# Patient Record
Sex: MALE | Race: WHITE | NOT HISPANIC OR LATINO | Employment: FULL TIME | URBAN - METROPOLITAN AREA
[De-identification: names, ages, dates, MRNs, and addresses within clinical notes are randomized per-mention and may not be internally consistent; named-entity substitution may affect disease eponyms.]

---

## 2023-10-06 PROBLEM — M17.32 POST-TRAUMATIC OSTEOARTHRITIS OF LEFT KNEE: Status: ACTIVE | Noted: 2023-10-06

## 2023-10-06 PROBLEM — M62.559 ATROPHY OF QUADRICEPS FEMORIS MUSCLE: Status: ACTIVE | Noted: 2023-10-06

## 2023-10-06 PROBLEM — M25.569 JOINT PAIN, KNEE: Status: ACTIVE | Noted: 2023-10-06

## 2023-10-06 PROBLEM — E55.9 VITAMIN D DEFICIENCY: Status: ACTIVE | Noted: 2023-10-06

## 2023-10-09 ENCOUNTER — TREATMENT (OUTPATIENT)
Dept: PHYSICAL THERAPY | Facility: CLINIC | Age: 58
End: 2023-10-09
Payer: COMMERCIAL

## 2023-10-09 DIAGNOSIS — M25.562 ARTHRALGIA OF LEFT KNEE: ICD-10-CM

## 2023-10-09 DIAGNOSIS — M25.569 JOINT PAIN, KNEE: ICD-10-CM

## 2023-10-09 DIAGNOSIS — M62.559 ATROPHY OF QUADRICEPS FEMORIS MUSCLE: Primary | ICD-10-CM

## 2023-10-09 PROCEDURE — 97110 THERAPEUTIC EXERCISES: CPT | Mod: GP

## 2023-10-09 ASSESSMENT — PAIN - FUNCTIONAL ASSESSMENT: PAIN_FUNCTIONAL_ASSESSMENT: 0-10

## 2023-10-09 ASSESSMENT — PAIN SCALES - GENERAL: PAINLEVEL_OUTOF10: 1

## 2023-10-09 ASSESSMENT — ENCOUNTER SYMPTOMS
DEPRESSION: 0
OCCASIONAL FEELINGS OF UNSTEADINESS: 0
LOSS OF SENSATION IN FEET: 0

## 2023-10-09 NOTE — PROGRESS NOTES
"  Physical Therapy  Physical Therapy Treatment Note    Patient Name: Ken Hylton  MRN: [id number]  Today's Date: 10/9/2023  Time Calculation  Start Time: 0738  Stop Time: 0825  Time Calculation (min): 47 min    Insurance:  Visit number: 6 of 20  Authorization info: Auth needed after 12 , 20 visit total   Insurance Type: [employer]   Current Problem  1. Atrophy of quadriceps femoris muscle        2. Joint pain, knee        3. Arthralgia of left knee            General  Reason for Referral: L knee pain and weakness  Referred By: Lobo Renee PA-C  Precautions   None     Subjective:   Subjective   Patient reports that he is feeling stronger on a day to day basis and is noticing more quad definition. Continues to be compliant with HEP.     Pain  Pain Assessment: 0-10  Pain Score: 1    Objective:   Ortho   Observation   Palpation: Pt had no increase in discomfort when patellar joint play was performed. No tenderness to the touch if L knee. Pt had noticable atrophy of L quadriceps compared to R.   ROM / Joint Mobility   (Range of Motion in degrees)   Knee:   (Key = \" P! \" Denotes Pain with Movement)   Extension: R Active 3, L Active 5.   Flexion: R Active 118, L Active 105.   Strength   Hip:   (Key: \"P!\" Denotes Pain with Movement)   Extension: 4+/5 on right and 4+/5 on left.   Flexion: 4+/5 on right and 4/5 on left.   Abduction: 4+/5 on right and 4+/5 on left.   Adduction: 5/5 on right and 5/5 on left.   Knee:   (Key: \" P! \" Denotes Pain with Movement)   Knee extension was 5/5 on right, 4+/5 on left.   Knee flexion was 5/5 on right, 5/5 on left.   Ortho Special Tests   Knee Special Tests:   Anterior Drawer: Right negative, Left negative  [place]ssaly 20 Degrees: Right negative, Left negative     Teatments:     Exercise Sets/Reps Comments   Seated LAQ (10#) 30-15-15-15 BFR set to 80% occlusion    Sit to stand 30-15-15-15 BFR set to 80% occlusion    Seated hamstring curl BTB 30-15-15-15 BFR set to 80% occlusion  "   Decline squat  2 x10 25#   Supine heel slide  1 x 20    Pt education on ROM exercise             Assessment:  Pt tolerated session well today.Pt was able to continue to progress all BFR exercises and decline squat weight. Pt verbalized that he is feeling stronger on a day to day basis. Pt continues to make progresstowards goals established in POC and should continue with skilled therapy        Plan: Continue to progress HEP weight, begin more ROM exercises, continue with BFR in clinic        Goals:  Encounter Problems       Encounter Problems (Active)       PT Problem       STG       Start:  10/09/23       1. Pt will increase LEFS with 65/80 or better in order to demo an increase in L knee function  2. Pt will increase L knee ROM to 5-115 in order to demo an increase in knee functional mobility   3. Pt will demo 4+/5 or all hip/knee MMT in order to demo an increase in LE strength   4. Pt will demo compliance and independence with HEP          LTG       Start:  10/09/23       1. Pt will increase LEFS with 75/80 or better in order to demo an increase in L knee function  2. Pt will increase L knee ROM to 5-120 in order to demo an increase in knee functional mobility   3. Pt will demo 5/5 or all hip/knee MMT in order to demo an increase in LE strength   4. Pt will be able to ascend and descend 10 steps with reciprocal gait pattern and proper knee control in order to demo and increase in functional mobility   5. Pt will be able to walk.5 miles with no increase in L knee pain and a non antalgic gait pattern                    Daryl Jackson, PT

## 2023-10-17 ENCOUNTER — TREATMENT (OUTPATIENT)
Dept: PHYSICAL THERAPY | Facility: CLINIC | Age: 58
End: 2023-10-17
Payer: COMMERCIAL

## 2023-10-17 DIAGNOSIS — M62.559 ATROPHY OF QUADRICEPS FEMORIS MUSCLE: Primary | ICD-10-CM

## 2023-10-17 DIAGNOSIS — M25.569 JOINT PAIN, KNEE: ICD-10-CM

## 2023-10-17 PROCEDURE — 97110 THERAPEUTIC EXERCISES: CPT | Mod: GP

## 2023-10-17 ASSESSMENT — PAIN SCALES - GENERAL: PAINLEVEL_OUTOF10: 1

## 2023-10-17 ASSESSMENT — PAIN - FUNCTIONAL ASSESSMENT: PAIN_FUNCTIONAL_ASSESSMENT: 0-10

## 2023-10-17 NOTE — PROGRESS NOTES
"  Physical Therapy  Physical Therapy Treatment Note    Patient Name: Ken Hylton  MRN: [id number]  Today's Date: 10/17/2023  Time Calculation  Start Time: 0750  Stop Time: 0838  Time Calculation (min): 48 min    Insurance:  Visit number: 7 of 20  Authorization info: Auth needed after 12 , 20 visit total   Insurance Type: [employer]   Current Problem  1. Atrophy of quadriceps femoris muscle        2. Joint pain, knee            General  Reason for Referral: L knee pain and weakness  Referred By: Lobo Renee PA-C  Precautions   None     Subjective:   Pt reports that his knee has been feeling good overall during the last week. Pt still feeling like his ROM needs to catch up but his strength is getting better     Pain  Pain Assessment: 0-10  Pain Score: 1    Objective:   Ortho   Observation   Palpation: Pt had no increase in discomfort when patellar joint play was performed. No tenderness to the touch if L knee. Pt had noticable atrophy of L quadriceps compared to R.   ROM / Joint Mobility   (Range of Motion in degrees)   Knee:   (Key = \" P! \" Denotes Pain with Movement)   Extension: R Active 3, L Active 5.   Flexion: R Active 118, L Active 115.   Strength   Hip:   (Key: \"P!\" Denotes Pain with Movement)   Extension: 4+/5 on right and 4+/5 on left.   Flexion: 4+/5 on right and 4/5 on left.   Abduction: 4+/5 on right and 4+/5 on left.   Adduction: 5/5 on right and 5/5 on left.   Knee:   (Key: \" P! \" Denotes Pain with Movement)   Knee extension was 5/5 on right, 4+/5 on left.   Knee flexion was 5/5 on right, 5/5 on left.   Ortho Special Tests   Knee Special Tests:   Anterior Drawer: Right negative, Left negative  [place]ss[place] 20 Degrees: Right negative, Left negative     Teatments:     Exercise Sets/Reps Comments   Seated knee extension (32) 30-15-15-15 BFR set to 80% occlusion    Seated hamstring curl (32)  30-15-15-15 BFR set to 80% occlusion    SL leg press (50#)  30-15-15-15 BFR set to 80% occlusion    AROM heel " "slide  1 x 10     4 and 6\" heel tap  3 x 10     PA tibial mob            Assessment:      Patient tolerated session very well today.  Patient was able to continue to progress BFR exercises without any increase in discomfort.  Patient's range of motion showed improvement today from initial evaluation.  Patient was educated to continue to work on range of motion when at home.  Patient is making excellent progress towards goals established the plan of care and to continue with skilled therapy.    Plan: Continue to progress HEP weight, begin more ROM exercises, continue with BFR in clinic        Goals:  Active       PT Problem       STG       Start:  10/09/23       1. Pt will increase LEFS with 65/80 or better in order to demo an increase in L knee function  2. Pt will increase L knee ROM to 5-115 in order to demo an increase in knee functional mobility   3. Pt will demo 4+/5 or all hip/knee MMT in order to demo an increase in LE strength   4. Pt will demo compliance and independence with HEP          LTG       Start:  10/09/23       1. Pt will increase LEFS with 75/80 or better in order to demo an increase in L knee function  2. Pt will increase L knee ROM to 5-120 in order to demo an increase in knee functional mobility   3. Pt will demo 5/5 or all hip/knee MMT in order to demo an increase in LE strength   4. Pt will be able to ascend and descend 10 steps with reciprocal gait pattern and proper knee control in order to demo and increase in functional mobility   5. Pt will be able to walk.5 miles with no increase in L knee pain and a non antalgic gait pattern                 Daryl Jackson, PT  "

## 2023-10-23 ENCOUNTER — TREATMENT (OUTPATIENT)
Dept: PHYSICAL THERAPY | Facility: CLINIC | Age: 58
End: 2023-10-23
Payer: COMMERCIAL

## 2023-10-23 DIAGNOSIS — M25.569 JOINT PAIN, KNEE: ICD-10-CM

## 2023-10-23 DIAGNOSIS — M62.559 ATROPHY OF QUADRICEPS FEMORIS MUSCLE: Primary | ICD-10-CM

## 2023-10-23 PROCEDURE — 97110 THERAPEUTIC EXERCISES: CPT | Mod: GP

## 2023-10-23 ASSESSMENT — PAIN - FUNCTIONAL ASSESSMENT: PAIN_FUNCTIONAL_ASSESSMENT: 0-10

## 2023-10-23 ASSESSMENT — PAIN SCALES - GENERAL: PAINLEVEL_OUTOF10: 0 - NO PAIN

## 2023-10-23 NOTE — PROGRESS NOTES
"  Physical Therapy  Physical Therapy Treatment Note    Patient Name: Ken Hylton  MRN: [id number]  Today's Date: 10/23/2023  Time Calculation  Start Time: 0745  Stop Time: 0830  Time Calculation (min): 45 min    Insurance:  Visit number: 8 of 20  Authorization info: Auth needed after 12 , 20 visit total   Insurance Type: [employer]   Current Problem  1. Atrophy of quadriceps femoris muscle        2. Joint pain, knee              General  Reason for Referral: L knee pain and weakness  Referred By: Lobo Renee PA-C  Precautions   None     Subjective:   Pt continues to feel like his ROM is lacking  but that his strength is improving. Pt feels like HEP is going well.   Pain  Pain Assessment: 0-10  Pain Score: 0 - No pain    Objective:   Ortho   Observation   Palpation: Pt had no increase in discomfort when patellar joint play was performed. No tenderness to the touch if L knee. Pt had noticable atrophy of L quadriceps compared to R.   ROM / Joint Mobility   (Range of Motion in degrees)   Knee:   (Key = \" P! \" Denotes Pain with Movement)   Extension: R Active 3, L Active 5.   Flexion: R Active 118, L Active 115.   Strength   Hip:   (Key: \"P!\" Denotes Pain with Movement)   Extension: 4+/5 on right and 4+/5 on left.   Flexion: 4+/5 on right and 4/5 on left.   Abduction: 4+/5 on right and 4+/5 on left.   Adduction: 5/5 on right and 5/5 on left.   Knee:   (Key: \" P! \" Denotes Pain with Movement)   Knee extension was 5/5 on right, 4+/5 on left.   Knee flexion was 5/5 on right, 5/5 on left.   Ortho Special Tests   Knee Special Tests:   Anterior Drawer: Right negative, Left negative  [place]ss[place] 20 Degrees: Right negative, Left negative     Teatments:     Exercise Sets/Reps Comments   Seated knee extension (40) 30-15-15-15 BFR set to 80% occlusion    Seated hamstring curl (40)  30-15-15-15 BFR set to 80% occlusion    SL shuttle press (25#) 30-15-15-15 BFR set to 80% occlusion    Banded 6\" TKE  2 x 15     Banded side " steps  3 x 30'     PA knee mob             Assessment:      Patient tolerated session very well today.  Patient was able to continue to progress resistance used with BFR exercise.  Patient continues to feel like he is making good strength gains.  Patient's knee range of motion continues to improve slightly each week but is still very stiff.  Patient continues to make excellent progress towards goals established the plan of care and continued skilled therapy.    Plan: Continue to progress HEP weight, begin more ROM exercises, continue with BFR in clinic        Goals:  Active       PT Problem       STG       Start:  10/09/23       1. Pt will increase LEFS with 65/80 or better in order to demo an increase in L knee function  2. Pt will increase L knee ROM to 5-115 in order to demo an increase in knee functional mobility   3. Pt will demo 4+/5 or all hip/knee MMT in order to demo an increase in LE strength   4. Pt will demo compliance and independence with HEP          LTG       Start:  10/09/23       1. Pt will increase LEFS with 75/80 or better in order to demo an increase in L knee function  2. Pt will increase L knee ROM to 5-120 in order to demo an increase in knee functional mobility   3. Pt will demo 5/5 or all hip/knee MMT in order to demo an increase in LE strength   4. Pt will be able to ascend and descend 10 steps with reciprocal gait pattern and proper knee control in order to demo and increase in functional mobility   5. Pt will be able to walk.5 miles with no increase in L knee pain and a non antalgic gait pattern                 Daryl Jackson, PT

## 2023-10-31 ENCOUNTER — TREATMENT (OUTPATIENT)
Dept: PHYSICAL THERAPY | Facility: CLINIC | Age: 58
End: 2023-10-31
Payer: COMMERCIAL

## 2023-10-31 DIAGNOSIS — M62.559 ATROPHY OF QUADRICEPS FEMORIS MUSCLE: Primary | ICD-10-CM

## 2023-10-31 DIAGNOSIS — M25.569 JOINT PAIN, KNEE: ICD-10-CM

## 2023-10-31 PROCEDURE — 97110 THERAPEUTIC EXERCISES: CPT | Mod: GP

## 2023-10-31 ASSESSMENT — PAIN SCALES - GENERAL: PAINLEVEL_OUTOF10: 0 - NO PAIN

## 2023-10-31 ASSESSMENT — PAIN - FUNCTIONAL ASSESSMENT: PAIN_FUNCTIONAL_ASSESSMENT: 0-10

## 2023-10-31 NOTE — PROGRESS NOTES
"  Physical Therapy  Physical Therapy Treatment Note    Patient Name: Ken Hylton  MRN: [id number]  Today's Date: 10/31/2023  Time Calculation  Start Time: 1750  Stop Time: 1842  Time Calculation (min): 52 min    Insurance:  Visit number: 9 of 20  Authorization info: Auth needed after 12 , 20 visit total   Insurance Type: [employer]   Current Problem  1. Atrophy of quadriceps femoris muscle        2. Joint pain, knee                General  Reason for Referral: L knee pain and weakness  Referred By: Lobo Renee PA-C  Precautions   None     Subjective:   Pt has been keeping up with his HEP. Still feeling some weakness 3 days after treatment sessions     Pain  Pain Assessment: 0-10  Pain Score: 0 - No pain    Objective:   Ortho   Observation   Palpation: Pt had no increase in discomfort when patellar joint play was performed. No tenderness to the touch if L knee. Pt had noticable atrophy of L quadriceps compared to R.   ROM / Joint Mobility   (Range of Motion in degrees)   Knee:   (Key = \" P! \" Denotes Pain with Movement)   Extension: R Active 3, L Active 5.   Flexion: R Active 118, L Active 115.   Strength   Hip:   (Key: \"P!\" Denotes Pain with Movement)   Extension: 4+/5 on right and 4+/5 on left.   Flexion: 4+/5 on right and 4/5 on left.   Abduction: 4+/5 on right and 4+/5 on left.   Adduction: 5/5 on right and 5/5 on left.   Knee:   (Key: \" P! \" Denotes Pain with Movement)   Knee extension was 5/5 on right, 4+/5 on left.   Knee flexion was 5/5 on right, 5/5 on left.   Ortho Special Tests   Knee Special Tests:   Anterior Drawer: Right negative, Left negative  [place]ss[place] 20 Degrees: Right negative, Left negative     Teatments:     Exercise Sets/Reps Comments   Standing banded TKE  30-15-15-15 BFR set to 80% occlusion    18\" goblet squat (20# KB)  30-15-15-15 BFR set to 80% occlusion    Hamstring curl (48#)  30-15-15-15 BFR set to 80% occlusion    Reverse slider lunge  3 x 10     Static lunge to foam with med " ball toss  3 x 10     Pt education on shoe insert            Assessment:      Patient tolerated session very well today.  Patient was able to perform all functional strengthening activities with BFR today and had no increase in discomfort.  Patient was also able to begin more proprioception exercises for his left knee.  Patient was given heel lift for shoe to see if this helps with his gait pattern.  Patient continues to make excellent progress towards goals established the plan of care and she continued skilled therapy.      Plan: Continue to progress HEP weight, begin more ROM exercises, continue with BFR in clinic        Goals:  Active       PT Problem       STG       Start:  10/09/23       1. Pt will increase LEFS with 65/80 or better in order to demo an increase in L knee function  2. Pt will increase L knee ROM to 5-115 in order to demo an increase in knee functional mobility   3. Pt will demo 4+/5 or all hip/knee MMT in order to demo an increase in LE strength   4. Pt will demo compliance and independence with HEP          LTG       Start:  10/09/23       1. Pt will increase LEFS with 75/80 or better in order to demo an increase in L knee function  2. Pt will increase L knee ROM to 5-120 in order to demo an increase in knee functional mobility   3. Pt will demo 5/5 or all hip/knee MMT in order to demo an increase in LE strength   4. Pt will be able to ascend and descend 10 steps with reciprocal gait pattern and proper knee control in order to demo and increase in functional mobility   5. Pt will be able to walk.5 miles with no increase in L knee pain and a non antalgic gait pattern                 Daryl Jackson, PT

## 2023-11-08 ENCOUNTER — APPOINTMENT (OUTPATIENT)
Dept: PHYSICAL THERAPY | Facility: CLINIC | Age: 58
End: 2023-11-08
Payer: COMMERCIAL

## 2023-11-16 ENCOUNTER — APPOINTMENT (OUTPATIENT)
Dept: PHYSICAL THERAPY | Facility: CLINIC | Age: 58
End: 2023-11-16
Payer: COMMERCIAL

## 2023-11-21 ENCOUNTER — TREATMENT (OUTPATIENT)
Dept: PHYSICAL THERAPY | Facility: CLINIC | Age: 58
End: 2023-11-21
Payer: COMMERCIAL

## 2023-11-21 DIAGNOSIS — M62.559 ATROPHY OF QUADRICEPS FEMORIS MUSCLE: Primary | ICD-10-CM

## 2023-11-21 DIAGNOSIS — M25.569 JOINT PAIN, KNEE: ICD-10-CM

## 2023-11-21 PROCEDURE — 97110 THERAPEUTIC EXERCISES: CPT | Mod: GP

## 2023-11-21 ASSESSMENT — PAIN - FUNCTIONAL ASSESSMENT: PAIN_FUNCTIONAL_ASSESSMENT: 0-10

## 2023-11-21 ASSESSMENT — PAIN SCALES - GENERAL: PAINLEVEL_OUTOF10: 0 - NO PAIN

## 2023-11-21 NOTE — PROGRESS NOTES
"  Physical Therapy  Physical Therapy Treatment Note    Patient Name: Ken Hylton  MRN: [id number]  Today's Date: 11/21/2023  Time Calculation  Start Time: 1818  Stop Time: 1904  Time Calculation (min): 46 min    Insurance:  Visit number: 10 of 20  Authorization info: Auth needed after 12 , 20 visit total   Insurance Type: [employer]   Current Problem  1. Atrophy of quadriceps femoris muscle        2. Joint pain, knee                General  Reason for Referral: L knee pain and weakness  Referred By: Lobo Renee PA-C  Precautions   None     Subjective:   Pt had one moment of heightened knee discomfort but was able to recover within 1-2 days. Pt has continued to keep up with HEP     Pain  Pain Assessment: 0-10  Pain Score: 0 - No pain    Objective:   Ortho   Observation   Palpation: Pt had no increase in discomfort when patellar joint play was performed. No tenderness to the touch if L knee. Pt had noticable atrophy of L quadriceps compared to R.   ROM / Joint Mobility   (Range of Motion in degrees)   Knee:   (Key = \" P! \" Denotes Pain with Movement)   Extension: R Active 3, L Active 5.   Flexion: R Active 118, L Active 115.   Strength   Hip:   (Key: \"P!\" Denotes Pain with Movement)   Extension: 4+/5 on right and 4+/5 on left.   Flexion: 4+/5 on right and 4/5 on left.   Abduction: 4+/5 on right and 4+/5 on left.   Adduction: 5/5 on right and 5/5 on left.   Knee:   (Key: \" P! \" Denotes Pain with Movement)   Knee extension was 5/5 on right, 4+/5 on left.   Knee flexion was 5/5 on right, 5/5 on left.   Ortho Special Tests   Knee Special Tests:   Anterior Drawer: Right negative, Left negative  [place]ss[place] 20 Degrees: Right negative, Left negative     Teatments:     Exercise Sets/Reps Comments   Seated LAQ 10# 30-15-15-15 BFR set to 80% occlusion    4\" step up  30-15-15-15 BFR set to 80% occlusion    Seated hamstring curl  30-15-15-15 BFR set to 80% occlusion    4 and 6\" heel tap  2 x 10     SL eccentric pistol " squat  2 x 10                 Assessment:      Patient tolerated session very well today.  Patient was able to continue to progress resistance used for blood flow restriction exercises today.  Patient demonstrated better control when performing heel tap exercise but still needed upper extremity assistance.  Patient was educated on possibly getting blood flow restriction cuff for home use for when he is done with PT.  Patient continues to make good progress towards goals established plan of care and to continue with skilled therapy.      Plan: Continue to progress HEP weight, begin more ROM exercises, continue with BFR in clinic        Goals:  Active       PT Problem       STG       Start:  10/09/23       1. Pt will increase LEFS with 65/80 or better in order to demo an increase in L knee function  2. Pt will increase L knee ROM to 5-115 in order to demo an increase in knee functional mobility   3. Pt will demo 4+/5 or all hip/knee MMT in order to demo an increase in LE strength   4. Pt will demo compliance and independence with HEP          LTG       Start:  10/09/23       1. Pt will increase LEFS with 75/80 or better in order to demo an increase in L knee function  2. Pt will increase L knee ROM to 5-120 in order to demo an increase in knee functional mobility   3. Pt will demo 5/5 or all hip/knee MMT in order to demo an increase in LE strength   4. Pt will be able to ascend and descend 10 steps with reciprocal gait pattern and proper knee control in order to demo and increase in functional mobility   5. Pt will be able to walk.5 miles with no increase in L knee pain and a non antalgic gait pattern                 Daryl Jackson, PT

## 2023-11-28 ENCOUNTER — APPOINTMENT (OUTPATIENT)
Dept: PHYSICAL THERAPY | Facility: CLINIC | Age: 58
End: 2023-11-28
Payer: COMMERCIAL

## 2023-12-27 ENCOUNTER — TREATMENT (OUTPATIENT)
Dept: PHYSICAL THERAPY | Facility: CLINIC | Age: 58
End: 2023-12-27
Payer: COMMERCIAL

## 2023-12-27 DIAGNOSIS — M62.559 ATROPHY OF QUADRICEPS FEMORIS MUSCLE: Primary | ICD-10-CM

## 2023-12-27 DIAGNOSIS — M25.569 JOINT PAIN, KNEE: ICD-10-CM

## 2023-12-27 PROCEDURE — 97110 THERAPEUTIC EXERCISES: CPT | Mod: GP

## 2023-12-27 ASSESSMENT — PAIN SCALES - GENERAL: PAINLEVEL_OUTOF10: 0 - NO PAIN

## 2023-12-27 ASSESSMENT — PAIN - FUNCTIONAL ASSESSMENT: PAIN_FUNCTIONAL_ASSESSMENT: 0-10

## 2023-12-27 NOTE — PROGRESS NOTES
"  Physical Therapy  Physical Therapy Treatment Note    Patient Name: Ken Hylton  MRN: [id number]  Today's Date: 12/27/2023  Time Calculation  Start Time: 1818  Stop Time: 1905  Time Calculation (min): 47 min    Insurance:  Visit number: 11 of 20  Authorization info: Auth needed after 12 , 20 visit total   Insurance Type: [employer]   Current Problem  1. Atrophy of quadriceps femoris muscle        2. Joint pain, knee                General  Reason for Referral: L knee pain and weakness  Referred By: Lobo Renee PA-C  Precautions   None     Subjective:   Pt has had moments of slight weakness when walking  but his pain has continued to be under control.     Pain  Pain Assessment: 0-10  Pain Score: 0 - No pain    Objective:   Ortho   Observation   Palpation: Pt had no increase in discomfort when patellar joint play was performed. No tenderness to the touch if L knee. Pt had noticable atrophy of L quadriceps compared to R.   ROM / Joint Mobility   (Range of Motion in degrees)   Knee:   (Key = \" P! \" Denotes Pain with Movement)   Extension: R Active 3, L Active 5.   Flexion: R Active 118, L Active 115.   Strength   Hip:   (Key: \"P!\" Denotes Pain with Movement)   Extension: 4+/5 on right and 4+/5 on left.   Flexion: 4+/5 on right and 4/5 on left.   Abduction: 4+/5 on right and 4+/5 on left.   Adduction: 5/5 on right and 5/5 on left.   Knee:   (Key: \" P! \" Denotes Pain with Movement)   Knee extension was 5/5 on right, 4+/5 on left.   Knee flexion was 5/5 on right, 5/5 on left.   Ortho Special Tests   Knee Special Tests:   Anterior Drawer: Right negative, Left negative  [place]ss[place] 20 Degrees: Right negative, Left negative     Teatments:     Exercise Sets/Reps Comments   Seated LAQ 10# 30-15-15-15 BFR set to 80% occlusion    BL leg press (120#) 30-15-15-15 BFR set to 80% occlusion    Seated hamstring curl  30-15-15-15 BFR set to 80% occlusion    Belted PA mob     Seated SL LAQ (24#)  2 x 10             "     Assessment:      Patient tolerated session very well today.  Patient was able to continue to progress resistance used for BFR exercises.  Patient was taken through a home program that he can continue to perform away from the clinic to provide maintenance for his knee.  Patient was educated that his chart will remain open for the next few weeks in case he feels like he needs to get in for a trip coming up.  Patient continues to make good progress towards goals established the plan of care.    Plan: Continue to progress HEP weight, begin more ROM exercises, continue with BFR in clinic        Goals:  Active       PT Problem       STG       Start:  10/09/23       1. Pt will increase LEFS with 65/80 or better in order to demo an increase in L knee function  2. Pt will increase L knee ROM to 5-115 in order to demo an increase in knee functional mobility   3. Pt will demo 4+/5 or all hip/knee MMT in order to demo an increase in LE strength   4. Pt will demo compliance and independence with HEP          LTG       Start:  10/09/23       1. Pt will increase LEFS with 75/80 or better in order to demo an increase in L knee function  2. Pt will increase L knee ROM to 5-120 in order to demo an increase in knee functional mobility   3. Pt will demo 5/5 or all hip/knee MMT in order to demo an increase in LE strength   4. Pt will be able to ascend and descend 10 steps with reciprocal gait pattern and proper knee control in order to demo and increase in functional mobility   5. Pt will be able to walk.5 miles with no increase in L knee pain and a non antalgic gait pattern                 Daryl Jackson, PT

## 2024-01-31 ENCOUNTER — OFFICE VISIT (OUTPATIENT)
Dept: ORTHOPEDIC SURGERY | Facility: CLINIC | Age: 59
End: 2024-01-31
Payer: COMMERCIAL

## 2024-01-31 DIAGNOSIS — M17.32 POST-TRAUMATIC OSTEOARTHRITIS OF LEFT KNEE: Primary | ICD-10-CM

## 2024-01-31 PROCEDURE — 20611 DRAIN/INJ JOINT/BURSA W/US: CPT | Performed by: EMERGENCY MEDICINE

## 2024-01-31 PROCEDURE — 99214 OFFICE O/P EST MOD 30 MIN: CPT | Performed by: EMERGENCY MEDICINE

## 2024-01-31 PROCEDURE — 1036F TOBACCO NON-USER: CPT | Performed by: EMERGENCY MEDICINE

## 2024-01-31 RX ORDER — METHYLPREDNISOLONE ACETATE 40 MG/ML
80 INJECTION, SUSPENSION INTRA-ARTICULAR; INTRALESIONAL; INTRAMUSCULAR; SOFT TISSUE
Status: COMPLETED | OUTPATIENT
Start: 2024-01-31 | End: 2024-01-31

## 2024-01-31 RX ORDER — LIDOCAINE HYDROCHLORIDE 10 MG/ML
4 INJECTION INFILTRATION; PERINEURAL
Status: COMPLETED | OUTPATIENT
Start: 2024-01-31 | End: 2024-01-31

## 2024-01-31 RX ADMIN — METHYLPREDNISOLONE ACETATE 80 MG: 40 INJECTION, SUSPENSION INTRA-ARTICULAR; INTRALESIONAL; INTRAMUSCULAR; SOFT TISSUE at 12:03

## 2024-01-31 RX ADMIN — LIDOCAINE HYDROCHLORIDE 4 ML: 10 INJECTION INFILTRATION; PERINEURAL at 12:03

## 2024-01-31 ASSESSMENT — PAIN - FUNCTIONAL ASSESSMENT: PAIN_FUNCTIONAL_ASSESSMENT: 0-10

## 2024-01-31 ASSESSMENT — PAIN DESCRIPTION - DESCRIPTORS: DESCRIPTORS: SHARP

## 2024-01-31 ASSESSMENT — PAIN SCALES - GENERAL: PAINLEVEL_OUTOF10: 7

## 2024-01-31 NOTE — PROGRESS NOTES
Subjective    Patient ID: Lobo Hylton is a 58 y.o. male.    Chief Complaint: Pain of the Left Knee (Est ref by Lobo Renee for Left knee pain CSI.)     Last Surgery: No surgery found  Last Surgery Date: No surgery found    Lobo is a very pleasant 58-year-old male coming in with acute on chronic left knee pain secondary to DJD after an ACL and meniscal injury when he was in high school.  He was referred here by Lobo Renee, last evaluated him in June of last year.  He had a cortisone injection at that time that lasted for about 6 months and he is interested in a repeat injection.  He also would like to discuss gel injections.  He denies any trauma and is leaving for [place] tomorrow.  He takes occasional Aleve for his pain.  Most of his pain is over the medial joint line and right now is rated as moderate.  No swelling.  He has done physical therapy with some improvement in his left quad strength.        Objective   Right Knee Exam   Right knee exam is normal.    Muscle Strength   The patient has normal right knee strength.      Left Knee Exam     Muscle Strength   The patient has normal left knee strength.    Tenderness   The patient is experiencing tenderness in the medial joint line.    Range of Motion   Extension:  abnormal   Flexion:  abnormal     Tests   Varus: negative Valgus: negative  Drawer:       Posterior - negative    Other   Erythema: absent  Sensation: normal  Pulse: present  Swelling: none  Effusion: no effusion present    Comments:  Patellar crepitus throughout range of motion testing.  Extension and flexion are both slightly limited.  Patient has a large healed scar over the medial aspect of the left knee from his prior ACL reconstruction.            Image Results:  XR knee complete 4 or more views  Narrative: Interpreted By:  JEROME NASCIMENTO MD  MRN: [id number]  Patient Name: NENA HYLTON     STUDY:  KNEE; COMPLT, 4 OR MORE VIEWS;  6/26/2023 8:26 am     INDICATION:  pain  M25.569: Joint pain,  "knee.     COMPARISON:  12/14/2015     ACCESSION NUMBER(S):  [id number]     ORDERING CLINICIAN:  KEN COATES     FINDINGS:  Left knee, four views     Postsurgical change status post ACL repair and ligamentous repair  about the knee, similar to the prior. There is severe  tricompartmental joint space narrowing with osteophytosis worse in  the patellofemoral compartment. There is moderate chondrocalcinosis  as well. There is a small effusion.     Impression: Severe left knee osteoarthritis, progressed from the prior. Moderate  chondrocalcinosis. Postsurgical changes in the knee    X-rays of the left knee were reviewed and interpreted by me on 1/31/2024 and revealed severe left knee DJD without any evidence of acute injuries or fractures.  Prior surgical hardware in place.    Patient ID: Ken Hylton \"Joelle" is a 58 y.o. male.    L Inj/Asp: L knee on 1/31/2024 12:03 PM  Indications: pain  Details: 22 G needle, ultrasound-guided superolateral approach  Medications: 80 mg methylPREDNISolone acetate 40 mg/mL; 4 mL lidocaine 10 mg/mL (1 %)  Outcome: tolerated well, no immediate complications  Procedure, treatment alternatives, risks and benefits explained, specific risks discussed. Consent was given by the patient. Patient was prepped and draped in the usual sterile fashion.           Assessment/Plan   Encounter Diagnoses:  Post-traumatic osteoarthritis of left knee    Orders Placed This Encounter    L Inj/Asp    Point of Care Ultrasound     No follow-ups on file.    We discussed his treatment options and agreed to perform an ultrasound-guided cortisone injection of his left knee here today in the office.  The patient tolerated the procedure well without any complications.  Activity modifications were reviewed.  We also discussed potentially doing viscosupplementation injections in the future but waiting until he is no longer getting at least 3 months of relief from the steroid injections.  He also agreed to begin " taking 1000 mg of Tylenol up to 3 times daily as needed for his pain.  He will call me as needed for follow-up in the future.    ** Please excuse any errors in grammar or translation related to this dictation. Voice recognition software was utilized to prepare this document. **       Kevin Ruiz MD  [facility]

## 2024-10-21 ENCOUNTER — OFFICE VISIT (OUTPATIENT)
Dept: ORTHOPEDIC SURGERY | Facility: CLINIC | Age: 59
End: 2024-10-21
Payer: COMMERCIAL

## 2024-10-21 DIAGNOSIS — M17.32 POST-TRAUMATIC OSTEOARTHRITIS OF LEFT KNEE: Primary | ICD-10-CM

## 2024-10-21 PROCEDURE — 1036F TOBACCO NON-USER: CPT | Performed by: PHYSICIAN ASSISTANT

## 2024-10-21 PROCEDURE — 2500000004 HC RX 250 GENERAL PHARMACY W/ HCPCS (ALT 636 FOR OP/ED): Performed by: PHYSICIAN ASSISTANT

## 2024-10-21 PROCEDURE — 20610 DRAIN/INJ JOINT/BURSA W/O US: CPT | Mod: LT | Performed by: PHYSICIAN ASSISTANT

## 2024-10-21 RX ORDER — TRIAMCINOLONE ACETONIDE 40 MG/ML
40 INJECTION, SUSPENSION INTRA-ARTICULAR; INTRAMUSCULAR
Status: COMPLETED | OUTPATIENT
Start: 2024-10-21 | End: 2024-10-21

## 2024-10-21 ASSESSMENT — PAIN - FUNCTIONAL ASSESSMENT: PAIN_FUNCTIONAL_ASSESSMENT: 0-10

## 2024-10-21 ASSESSMENT — PAIN SCALES - GENERAL: PAINLEVEL_OUTOF10: 3

## 2024-10-21 NOTE — PROGRESS NOTES
"Subjective    Patient ID: Ken Hylton \"Joelle" is a 59 y.o. male.    Chief Complaint: Follow-up of the Left Knee           HPI:  Ken Hylton \"Joelle" is a 59 y.o. male with known posttraumatic degenerative changes in the left knee.  He presents today for planned intra-articular steroid injection.    ROS  Constitutional: No fever, no chills, not feeling tired, no recent weight gain and no recent weight loss  ENT: No nosebleeds  Cardiovascular: No chest pain  Respiratory: No shortness of breath and no cough  Gastrointestinal: No abdominal pain, no nausea, no diarrhea, and no vomiting  Musculoskeletal: No arthralgias  Integumentary: No rashes and no skin lesions  Neurological: No headache  Psychiatric: No sleep disturbances no depression  Endocrine: No muscle weakness and no muscle cramps  Hematologic/lymphatic: No swelling glands and no tendency for easy bruising    Past Medical History:   Diagnosis Date    Pure hypercholesterolemia, unspecified 12/10/2021    Elevated LDL cholesterol level        History reviewed. No pertinent surgical history.     No current outpatient medications on file.     No Known Allergies     Social Connections: Not on file          Objective   59-year-old male well appearing in no acute distress. Alert and oriented ×3.  Skin intact bilateral lower extremities.   Normal tandem gait. Coordination and balance intact.  Bilateral lower extremity compartments supple.  2+ DP/PT pulses bilaterally.  Left knee injection site is clear    L Inj/Asp: L knee on 10/21/2024 9:02 AM  Indications: pain  Details: 22 G needle, superolateral approach  Medications: 40 mg triamcinolone acetonide 40 mg/mL            Knee injection:    Left knee intra-articular injection is offered for therapeutic purposes.  Indication is knee pain.  Risks and benefits of the injection were discussed.  After questions were answered, consent was provided to proceed.  Under sterile conditions, the knee was injected through " a superolateral patellar site with 40 mg Kenalog and 5 cc lidocaine without complication.  The patient tolerated the injection well.  A dressing was applied.  Post injection instructions were given.        Assessment/Plan   Encounter Diagnoses:  Post-traumatic osteoarthritis of left knee    No orders of the defined types were placed in this encounter.      Hopefully today's injection will provide him with some relief.  We discussed when would be an appropriate time to pivot to gel injections but since he is doing so well with steroid injections will continue with these.  He will call with any questions or concerns.    This office note was dictated using Dragon voice to text software and was not proofread for spelling or grammatical errors

## 2025-06-23 ENCOUNTER — APPOINTMENT (OUTPATIENT)
Dept: ORTHOPEDIC SURGERY | Facility: HOSPITAL | Age: 60
End: 2025-06-23
Payer: COMMERCIAL

## 2025-06-23 DIAGNOSIS — M17.32 POST-TRAUMATIC OSTEOARTHRITIS OF LEFT KNEE: Primary | ICD-10-CM

## 2025-06-23 PROCEDURE — 2500000004 HC RX 250 GENERAL PHARMACY W/ HCPCS (ALT 636 FOR OP/ED): Performed by: PHYSICIAN ASSISTANT

## 2025-06-23 PROCEDURE — 99212 OFFICE O/P EST SF 10 MIN: CPT | Performed by: PHYSICIAN ASSISTANT

## 2025-06-23 PROCEDURE — 1036F TOBACCO NON-USER: CPT | Performed by: PHYSICIAN ASSISTANT

## 2025-06-23 PROCEDURE — 99202 OFFICE O/P NEW SF 15 MIN: CPT | Mod: 25 | Performed by: PHYSICIAN ASSISTANT

## 2025-06-23 PROCEDURE — 20610 DRAIN/INJ JOINT/BURSA W/O US: CPT | Mod: LT | Performed by: PHYSICIAN ASSISTANT

## 2025-06-23 RX ORDER — TRIAMCINOLONE ACETONIDE 40 MG/ML
40 INJECTION, SUSPENSION INTRA-ARTICULAR; INTRAMUSCULAR
Status: COMPLETED | OUTPATIENT
Start: 2025-06-23 | End: 2025-06-23

## 2025-06-23 RX ADMIN — TRIAMCINOLONE ACETONIDE 40 MG: 400 INJECTION, SUSPENSION INTRA-ARTICULAR; INTRAMUSCULAR at 08:35

## 2025-06-23 ASSESSMENT — PAIN - FUNCTIONAL ASSESSMENT: PAIN_FUNCTIONAL_ASSESSMENT: 0-10

## 2025-06-23 ASSESSMENT — PAIN SCALES - GENERAL: PAINLEVEL_OUTOF10: 6

## 2025-06-23 NOTE — PROGRESS NOTES
"Subjective    Patient ID: Ken Hylton \"Joelle" is a 59 y.o. male.    Chief Complaint: Injections of the Left Knee           HPI:  Ken Hylton \"Joelle" is a 59 y.o. male with known posttraumatic degenerative changes of the left knee.  He presents today for a planned intra-articular steroid injection.    ROS  Constitutional: No fever, no chills, not feeling tired, no recent weight gain and no recent weight loss  ENT: No nosebleeds  Cardiovascular: No chest pain  Respiratory: No shortness of breath and no cough  Gastrointestinal: No abdominal pain, no nausea, no diarrhea, and no vomiting  Musculoskeletal: No arthralgias  Integumentary: No rashes and no skin lesions  Neurological: No headache  Psychiatric: No sleep disturbances no depression  Endocrine: No muscle weakness and no muscle cramps  Hematologic/lymphatic: No swelling glands and no tendency for easy bruising    Medical History[1]     Surgical History[2]     Current Medications[3]     RX Allergies[4]     Social Connections: Not on file          Objective   59-year-old male well appearing in no acute distress. Alert and oriented ×3.  Skin intact bilateral lower extremities.   Normal tandem gait. Coordination and balance intact.  Bilateral lower extremity compartments supple.  2+ DP/PT pulses bilaterally.  Injection site left knee is clear    L Inj/Asp: L knee on 6/23/2025 8:35 AM  Indications: pain  Details: 22 G needle, superolateral approach  Medications: 40 mg triamcinolone acetonide 40 mg/mL      Knee injection:    Left knee intra-articular injection is offered for therapeutic purposes.  Indication is knee pain.  Risks and benefits of the injection were discussed.  After questions were answered, consent was provided to proceed.  Under sterile conditions, the knee was injected through a superolateral patellar site with 40 mg Kenalog and 5 cc lidocaine without complication.  The patient tolerated the injection well.  A dressing was applied.  Post " injection instructions were given.      Assessment/Plan   Encounter Diagnoses:  Post-traumatic osteoarthritis of left knee    No orders of the defined types were placed in this encounter.      Hopefully he does well with today's injection.  His last one gave him about 7 to 8 months of improvement.  He is considering arthroplasty.  I recommended consultation with Dr. King.  He agreed with this plan.    This office note was dictated using Dragon voice to text software and was not proofread for spelling or grammatical errors       [1]   Past Medical History:  Diagnosis Date    Pure hypercholesterolemia, unspecified 12/10/2021    Elevated LDL cholesterol level   [2] History reviewed. No pertinent surgical history.  [3] No current outpatient medications on file.  [4] No Known Allergies

## 2025-07-25 ENCOUNTER — APPOINTMENT (OUTPATIENT)
Dept: ORTHOPEDIC SURGERY | Facility: CLINIC | Age: 60
End: 2025-07-25
Payer: COMMERCIAL

## 2025-08-21 ENCOUNTER — HOSPITAL ENCOUNTER (OUTPATIENT)
Dept: RADIOLOGY | Facility: CLINIC | Age: 60
Discharge: HOME | End: 2025-08-21
Payer: COMMERCIAL

## 2025-08-21 ENCOUNTER — APPOINTMENT (OUTPATIENT)
Dept: ORTHOPEDIC SURGERY | Facility: CLINIC | Age: 60
End: 2025-08-21
Payer: COMMERCIAL

## 2025-08-21 DIAGNOSIS — M17.32 POST-TRAUMATIC OSTEOARTHRITIS OF LEFT KNEE: ICD-10-CM

## 2025-08-21 PROCEDURE — 1036F TOBACCO NON-USER: CPT | Performed by: ORTHOPAEDIC SURGERY

## 2025-08-21 PROCEDURE — 73564 X-RAY EXAM KNEE 4 OR MORE: CPT | Mod: LEFT SIDE | Performed by: RADIOLOGY

## 2025-08-21 PROCEDURE — 99212 OFFICE O/P EST SF 10 MIN: CPT

## 2025-08-21 PROCEDURE — 99213 OFFICE O/P EST LOW 20 MIN: CPT | Performed by: ORTHOPAEDIC SURGERY

## 2025-08-21 PROCEDURE — 73564 X-RAY EXAM KNEE 4 OR MORE: CPT | Mod: LT

## 2025-08-22 ENCOUNTER — APPOINTMENT (OUTPATIENT)
Dept: ORTHOPEDIC SURGERY | Facility: CLINIC | Age: 60
End: 2025-08-22
Payer: COMMERCIAL